# Patient Record
Sex: MALE | Race: WHITE | NOT HISPANIC OR LATINO | Employment: OTHER | URBAN - METROPOLITAN AREA
[De-identification: names, ages, dates, MRNs, and addresses within clinical notes are randomized per-mention and may not be internally consistent; named-entity substitution may affect disease eponyms.]

---

## 2023-11-03 ASSESSMENT — PATIENT HEALTH QUESTIONNAIRE - PHQ9
5. POOR APPETITE OR OVEREATING: 0
7. TROUBLE CONCENTRATING ON THINGS, SUCH AS READING THE NEWSPAPER OR WATCHING TELEVISION: 0
6. FEELING BAD ABOUT YOURSELF - OR THAT YOU ARE A FAILURE OR HAVE LET YOURSELF OR YOUR FAMILY DOWN: NOT AT ALL
4. FEELING TIRED OR HAVING LITTLE ENERGY: 0
1. LITTLE INTEREST OR PLEASURE IN DOING THINGS: NOT AT ALL
4. FEELING TIRED OR HAVING LITTLE ENERGY: NOT AT ALL
SUM OF ALL RESPONSES TO PHQ QUESTIONS 1-9: 0
2. FEELING DOWN, DEPRESSED OR HOPELESS: NOT AT ALL
3. TROUBLE FALLING OR STAYING ASLEEP OR SLEEPING TOO MUCH: NOT AT ALL
3. TROUBLE FALLING OR STAYING ASLEEP OR SLEEPING TOO MUCH: NOT AT ALL
7. TROUBLE CONCENTRATING ON THINGS, SUCH AS READING THE NEWSPAPER OR WATCHING TELEVISION: NOT AT ALL
2. FEELING DOWN, DEPRESSED, IRRITABLE, OR HOPELESS: NOT AT ALL
9. THOUGHTS THAT YOU WOULD BE BETTER OFF DEAD, OR OF HURTING YOURSELF: NOT AT ALL
4. FEELING TIRED OR HAVING LITTLE ENERGY: NOT AT ALL
9. THOUGHTS THAT YOU WOULD BE BETTER OFF DEAD, OR OF HURTING YOURSELF: 0
1. LITTLE INTEREST OR PLEASURE IN DOING THINGS: NOT AT ALL
SUM OF ALL RESPONSES TO PHQ QUESTIONS 1-9: 0
8. MOVING OR SPEAKING SO SLOWLY THAT OTHER PEOPLE COULD HAVE NOTICED. OR THE OPPOSITE, BEING SO FIGETY OR RESTLESS THAT YOU HAVE BEEN MOVING AROUND A LOT MORE THAN USUAL: 0
5. POOR APPETITE OR OVEREATING: NOT AT ALL
5. POOR APPETITE OR OVEREATING: NOT AT ALL
8. MOVING OR SPEAKING SO SLOWLY THAT OTHER PEOPLE COULD HAVE NOTICED. OR THE OPPOSITE, BEING SO FIGETY OR RESTLESS THAT YOU HAVE BEEN MOVING AROUND A LOT MORE THAN USUAL: NOT AT ALL
9. THOUGHTS THAT YOU WOULD BE BETTER OFF DEAD, OR OF HURTING YOURSELF: NOT AT ALL
6. FEELING BAD ABOUT YOURSELF - OR THAT YOU ARE A FAILURE OR HAVE LET YOURSELF OR YOUR FAMILY DOWN: NOT AT ALL
7. TROUBLE CONCENTRATING ON THINGS, SUCH AS READING THE NEWSPAPER OR WATCHING TELEVISION: NOT AT ALL
8. MOVING OR SPEAKING SO SLOWLY THAT OTHER PEOPLE COULD HAVE NOTICED. OR THE OPPOSITE, BEING SO FIGETY OR RESTLESS THAT YOU HAVE BEEN MOVING AROUND A LOT MORE THAN USUAL: NOT AT ALL
2. FEELING DOWN, DEPRESSED, IRRITABLE, OR HOPELESS: 0
1. LITTLE INTEREST OR PLEASURE IN DOING THINGS: 0
6. FEELING BAD ABOUT YOURSELF - OR THAT YOU ARE A FAILURE OR HAVE LET YOURSELF OR YOUR FAMILY DOWN: 0
3. TROUBLE FALLING OR STAYING ASLEEP OR SLEEPING TOO MUCH: 0

## 2023-11-06 PROBLEM — E78.00 PURE HYPERCHOLESTEROLEMIA: Status: ACTIVE | Noted: 2023-11-06

## 2023-11-06 PROBLEM — K76.89 HEPATIC CYST: Status: ACTIVE | Noted: 2023-11-06

## 2023-11-06 PROBLEM — I49.3 MULTIPLE PREMATURE VENTRICULAR COMPLEXES: Status: ACTIVE | Noted: 2023-11-06

## 2023-11-06 PROBLEM — E66.811 OBESITY, CLASS I, BMI 30-34.9: Status: ACTIVE | Noted: 2018-06-20

## 2023-11-06 PROBLEM — Z79.82 CURRENT USE OF ASPIRIN: Status: ACTIVE | Noted: 2023-11-06

## 2023-11-06 PROBLEM — D36.9 TUBULAR ADENOMA: Status: ACTIVE | Noted: 2023-11-06

## 2023-11-06 PROBLEM — E66.9 OBESITY, CLASS I, BMI 30-34.9: Status: ACTIVE | Noted: 2018-06-20

## 2023-11-06 PROBLEM — I10 ESSENTIAL HYPERTENSION, BENIGN: Status: ACTIVE | Noted: 2017-12-20

## 2023-11-06 PROBLEM — D46.9 MYELODYSPLASTIC SYNDROME (MULTI): Status: ACTIVE | Noted: 2020-12-23

## 2023-11-06 PROBLEM — R00.1 BRADYCARDIA, SINUS: Status: ACTIVE | Noted: 2023-11-06

## 2023-11-06 PROBLEM — R91.1 LUNG NODULE: Status: ACTIVE | Noted: 2023-11-06

## 2023-11-06 PROBLEM — N28.1 RENAL CYST: Status: ACTIVE | Noted: 2023-11-06

## 2023-11-06 PROBLEM — Z71.89 CARDIAC RISK COUNSELING: Status: ACTIVE | Noted: 2023-11-06

## 2023-11-06 PROBLEM — Z86.010 HISTORY OF COLON POLYPS: Status: ACTIVE | Noted: 2023-11-06

## 2023-11-06 PROBLEM — Z86.0100 HISTORY OF COLON POLYPS: Status: ACTIVE | Noted: 2023-11-06

## 2023-11-06 RX ORDER — METFORMIN HYDROCHLORIDE 500 MG/1
0.5 TABLET ORAL DAILY
COMMUNITY
Start: 2023-03-23

## 2023-11-06 RX ORDER — AMLODIPINE BESYLATE 10 MG/1
10 TABLET ORAL
COMMUNITY
Start: 2023-10-13

## 2023-11-06 RX ORDER — ATORVASTATIN CALCIUM 10 MG/1
1 TABLET, FILM COATED ORAL DAILY
COMMUNITY
Start: 2017-09-19 | End: 2023-11-08 | Stop reason: SDUPTHER

## 2023-11-06 RX ORDER — HYDROCHLOROTHIAZIDE 25 MG/1
1 TABLET ORAL DAILY
COMMUNITY
Start: 2023-08-21

## 2023-11-06 RX ORDER — LOSARTAN POTASSIUM 100 MG/1
1 TABLET ORAL DAILY
COMMUNITY
Start: 2023-08-21

## 2023-11-08 ENCOUNTER — OFFICE VISIT (OUTPATIENT)
Dept: CARDIOLOGY | Facility: CLINIC | Age: 68
End: 2023-11-08
Payer: MEDICARE

## 2023-11-08 VITALS
WEIGHT: 225.6 LBS | DIASTOLIC BLOOD PRESSURE: 60 MMHG | HEIGHT: 71 IN | BODY MASS INDEX: 31.58 KG/M2 | OXYGEN SATURATION: 96 % | SYSTOLIC BLOOD PRESSURE: 125 MMHG | HEART RATE: 66 BPM

## 2023-11-08 DIAGNOSIS — R00.1 BRADYCARDIA, SINUS: ICD-10-CM

## 2023-11-08 DIAGNOSIS — I49.3 MULTIPLE PREMATURE VENTRICULAR COMPLEXES: Primary | ICD-10-CM

## 2023-11-08 DIAGNOSIS — E78.00 PURE HYPERCHOLESTEROLEMIA: ICD-10-CM

## 2023-11-08 DIAGNOSIS — I10 ESSENTIAL HYPERTENSION, BENIGN: ICD-10-CM

## 2023-11-08 PROCEDURE — 3074F SYST BP LT 130 MM HG: CPT | Performed by: INTERNAL MEDICINE

## 2023-11-08 PROCEDURE — 99213 OFFICE O/P EST LOW 20 MIN: CPT | Performed by: INTERNAL MEDICINE

## 2023-11-08 PROCEDURE — 93000 ELECTROCARDIOGRAM COMPLETE: CPT | Performed by: INTERNAL MEDICINE

## 2023-11-08 PROCEDURE — 3078F DIAST BP <80 MM HG: CPT | Performed by: INTERNAL MEDICINE

## 2023-11-08 PROCEDURE — 1036F TOBACCO NON-USER: CPT | Performed by: INTERNAL MEDICINE

## 2023-11-08 PROCEDURE — 1160F RVW MEDS BY RX/DR IN RCRD: CPT | Performed by: INTERNAL MEDICINE

## 2023-11-08 PROCEDURE — 1159F MED LIST DOCD IN RCRD: CPT | Performed by: INTERNAL MEDICINE

## 2023-11-08 RX ORDER — ATORVASTATIN CALCIUM 20 MG/1
20 TABLET, FILM COATED ORAL DAILY
Qty: 90 TABLET | Refills: 3 | Status: SHIPPED | OUTPATIENT
Start: 2023-11-08 | End: 2024-11-07

## 2023-11-08 RX ORDER — NIACINAMIDE 500 MG
500 TABLET ORAL 2 TIMES DAILY
COMMUNITY
Start: 2023-09-11

## 2023-11-08 RX ORDER — BETAMETHASONE DIPROPIONATE 0.5 MG/G
0.05 CREAM TOPICAL DAILY
COMMUNITY
Start: 2023-09-10

## 2023-11-08 NOTE — PATIENT INSTRUCTIONS
Weight loss  Heart healthy diet-more plants the better  Increase atorvastatin to 20 mg daily  Increase activity-walk 30 minutes daily

## 2023-11-08 NOTE — PROGRESS NOTES
"Chief Complaint:   Hypertension, Bradycardia, and pvcs (Annual)     History Of Present Illness:    Sebastien Christian is a 68 y.o. male presenting with CV dz  Patient denies chest pain/SOB/palpitations/dizziness/lightheadedness/edema/claudication    No regular exercise-\"lazy\"     Last Recorded Vitals:  Vitals:    11/08/23 1504 11/08/23 1532   BP: 150/78 125/60   BP Location: Left arm    Patient Position: Sitting    BP Cuff Size: Adult    Pulse: 66    SpO2: 96%    Weight: 102 kg (225 lb 9.6 oz)    Height: 1.791 m (5' 10.5\")             Allergies:  Patient has no known allergies.    Outpatient Medications:  Current Outpatient Medications   Medication Instructions    amLODIPine (NORVASC) 10 mg, oral, Daily RT    atorvastatin (Lipitor) 10 mg tablet 1 tablet, oral, Daily    betamethasone, augmented, (Diprolene AF) 0.05 % cream 0.05 g, Topical, Daily    hydroCHLOROthiazide (HYDRODiuril) 25 mg tablet 1 tablet, oral, Daily    losartan (Cozaar) 100 mg tablet 1 tablet, oral, Daily    metFORMIN (Glucophage) 500 mg tablet 0.5 tablets, oral, Daily    niacinamide 500 mg, oral, 2 times daily       Physical Exam:  Constitutional:       Appearance: Healthy appearance. Overweight and not in distress.   Neck:      Vascular: No JVR. JVD normal.   Pulmonary:      Effort: Pulmonary effort is normal.      Breath sounds: Normal breath sounds. No wheezing. No rhonchi. No rales.   Chest:      Chest wall: Not tender to palpatation.   Cardiovascular:      PMI at left midclavicular line. Normal rate. Regular rhythm. Normal S1. Normal S2.       Murmurs: There is no murmur.      No gallop.  No click. No rub.   Pulses:     Intact distal pulses.   Edema:     Peripheral edema absent.   Abdominal:      General: Abdomen is protuberant. Bowel sounds are normal.      Palpations: Abdomen is soft.      Tenderness: There is no abdominal tenderness.   Musculoskeletal: Normal range of motion.         General: No tenderness. Skin:     General: Skin is warm and " dry.   Neurological:      General: No focal deficit present.      Mental Status: Alert and oriented to person, place and time.          Last Labs:  CBC -  Lab Results   Component Value Date    WBC 3.8 (L) 05/11/2023    HGB 15.5 05/11/2023    HCT 45.4 05/11/2023    MCV 86 05/11/2023    PLT 84 (L) 05/11/2023       CMP -  Lab Results   Component Value Date    CALCIUM 9.3 05/11/2023    PROT 6.9 05/11/2023    ALBUMIN 4.3 05/11/2023    AST 15 05/11/2023    ALT 19 05/11/2023    ALKPHOS 52 05/11/2023    BILITOT 0.9 05/11/2023       LIPID PANEL -   Lab Results   Component Value Date    CHOL 174 04/09/2020    TRIG 74 04/09/2020    HDL 52.2 04/09/2020    CHHDL 3.3 04/09/2020    LDLF 107 (H) 04/09/2020    VLDL 15 04/09/2020 11/2023 MKEXL=627  QIK=963    RENAL FUNCTION PANEL -   Lab Results   Component Value Date    GLUCOSE 104 (H) 05/11/2023     05/11/2023    K 3.8 05/11/2023     05/11/2023    CO2 28 05/11/2023    ANIONGAP 11 05/11/2023    BUN 18 05/11/2023    CREATININE 1.03 05/11/2023    GFRMALE 79 05/11/2023    CALCIUM 9.3 05/11/2023    ALBUMIN 4.3 05/11/2023        Lab Results   Component Value Date    HGBA1C 6.3 (H) 10/30/2023           Lab review: I have personally reviewed the laboratory result(s)       Problem List Items Addressed This Visit       Bradycardia, sinus    Overview     Resolved after stopping propranolol         Relevant Orders    ECG 12 lead (Clinic Performed) (Completed)    Essential hypertension, benign    Overview     BP well controlled.         Relevant Orders    ECG 12 lead (Clinic Performed) (Completed)    Multiple premature ventricular complexes - Primary    Overview     No structural heart dz per 11/2021 stress echo  In NSR on exam/EKG today  Had bradycardia on beta blocker         Pure hypercholesterolemia    Overview     On moderate intensity statin with QOQ=761.As has DM will increase atorv to 20 mg daily Note: zero calcium score in the past.                Vahid Cruz,

## 2023-11-09 ENCOUNTER — OFFICE VISIT (OUTPATIENT)
Dept: HEMATOLOGY/ONCOLOGY | Facility: CLINIC | Age: 68
End: 2023-11-09
Payer: MEDICARE

## 2023-11-09 ENCOUNTER — LAB (OUTPATIENT)
Dept: LAB | Facility: CLINIC | Age: 68
End: 2023-11-09
Payer: MEDICARE

## 2023-11-09 VITALS
SYSTOLIC BLOOD PRESSURE: 162 MMHG | HEART RATE: 68 BPM | TEMPERATURE: 96.8 F | WEIGHT: 224.87 LBS | RESPIRATION RATE: 16 BRPM | OXYGEN SATURATION: 96 % | DIASTOLIC BLOOD PRESSURE: 78 MMHG | BODY MASS INDEX: 31.81 KG/M2

## 2023-11-09 DIAGNOSIS — D46.9 MYELODYSPLASTIC SYNDROME (MULTI): Primary | ICD-10-CM

## 2023-11-09 DIAGNOSIS — D75.9 IDIOPATHIC CYTOPENIA OF UNDETERMINED SIGNIFICANCE (ICUS): ICD-10-CM

## 2023-11-09 LAB
ALBUMIN SERPL BCP-MCNC: 4.4 G/DL (ref 3.4–5)
ALP SERPL-CCNC: 50 U/L (ref 33–136)
ALT SERPL W P-5'-P-CCNC: 21 U/L (ref 10–52)
ANION GAP SERPL CALC-SCNC: 10 MMOL/L (ref 10–20)
AST SERPL W P-5'-P-CCNC: 17 U/L (ref 9–39)
BASOPHILS # BLD MANUAL: 0.04 X10*3/UL (ref 0–0.1)
BASOPHILS NFR BLD MANUAL: 1 %
BILIRUB SERPL-MCNC: 0.7 MG/DL (ref 0–1.2)
BUN SERPL-MCNC: 22 MG/DL (ref 6–23)
CALCIUM SERPL-MCNC: 9.7 MG/DL (ref 8.6–10.3)
CHLORIDE SERPL-SCNC: 106 MMOL/L (ref 98–107)
CO2 SERPL-SCNC: 29 MMOL/L (ref 21–32)
CREAT SERPL-MCNC: 0.85 MG/DL (ref 0.5–1.3)
EOSINOPHIL # BLD MANUAL: 0 X10*3/UL (ref 0–0.7)
EOSINOPHIL NFR BLD MANUAL: 0 %
ERYTHROCYTE [DISTWIDTH] IN BLOOD BY AUTOMATED COUNT: 14.8 % (ref 11.5–14.5)
GFR SERPL CREATININE-BSD FRML MDRD: >90 ML/MIN/1.73M*2
GIANT PLATELETS BLD QL SMEAR: NORMAL
GLUCOSE SERPL-MCNC: 103 MG/DL (ref 74–99)
HCT VFR BLD AUTO: 46.6 % (ref 41–52)
HGB BLD-MCNC: 15.2 G/DL (ref 13.5–17.5)
IMM GRANULOCYTES # BLD AUTO: 0.02 X10*3/UL (ref 0–0.7)
IMM GRANULOCYTES NFR BLD AUTO: 0.6 % (ref 0–0.9)
LDH SERPL L TO P-CCNC: 199 U/L (ref 84–246)
LYMPHOCYTES # BLD MANUAL: 1.51 X10*3/UL (ref 1.2–4.8)
LYMPHOCYTES NFR BLD MANUAL: 42 %
MCH RBC QN AUTO: 28.8 PG (ref 26–34)
MCHC RBC AUTO-ENTMCNC: 32.6 G/DL (ref 32–36)
MCV RBC AUTO: 88 FL (ref 80–100)
MONOCYTES # BLD MANUAL: 0.54 X10*3/UL (ref 0.1–1)
MONOCYTES NFR BLD MANUAL: 15 %
NEUTS SEG # BLD MANUAL: 1.51 X10*3/UL (ref 1.2–7)
NEUTS SEG NFR BLD MANUAL: 42 %
NRBC BLD-RTO: ABNORMAL /100{WBCS}
PLATELET # BLD AUTO: 94 X10*3/UL (ref 150–450)
POTASSIUM SERPL-SCNC: 4.2 MMOL/L (ref 3.5–5.3)
PROT SERPL-MCNC: 7.2 G/DL (ref 6.4–8.2)
RBC # BLD AUTO: 5.28 X10*6/UL (ref 4.5–5.9)
RBC MORPH BLD: NORMAL
SODIUM SERPL-SCNC: 141 MMOL/L (ref 136–145)
TOTAL CELLS COUNTED BLD: 100
URATE SERPL-MCNC: 4.9 MG/DL (ref 4–7.5)
WBC # BLD AUTO: 3.6 X10*3/UL (ref 4.4–11.3)

## 2023-11-09 PROCEDURE — 1126F AMNT PAIN NOTED NONE PRSNT: CPT | Performed by: INTERNAL MEDICINE

## 2023-11-09 PROCEDURE — 99213 OFFICE O/P EST LOW 20 MIN: CPT | Performed by: INTERNAL MEDICINE

## 2023-11-09 PROCEDURE — 80053 COMPREHEN METABOLIC PANEL: CPT

## 2023-11-09 PROCEDURE — 84550 ASSAY OF BLOOD/URIC ACID: CPT

## 2023-11-09 PROCEDURE — 36415 COLL VENOUS BLD VENIPUNCTURE: CPT

## 2023-11-09 PROCEDURE — 1160F RVW MEDS BY RX/DR IN RCRD: CPT | Performed by: INTERNAL MEDICINE

## 2023-11-09 PROCEDURE — 83615 LACTATE (LD) (LDH) ENZYME: CPT

## 2023-11-09 PROCEDURE — 3078F DIAST BP <80 MM HG: CPT | Performed by: INTERNAL MEDICINE

## 2023-11-09 PROCEDURE — 3077F SYST BP >= 140 MM HG: CPT | Performed by: INTERNAL MEDICINE

## 2023-11-09 PROCEDURE — 85027 COMPLETE CBC AUTOMATED: CPT

## 2023-11-09 PROCEDURE — 85007 BL SMEAR W/DIFF WBC COUNT: CPT

## 2023-11-09 PROCEDURE — 1036F TOBACCO NON-USER: CPT | Performed by: INTERNAL MEDICINE

## 2023-11-09 PROCEDURE — 1159F MED LIST DOCD IN RCRD: CPT | Performed by: INTERNAL MEDICINE

## 2023-11-09 ASSESSMENT — ENCOUNTER SYMPTOMS
VOMITING: 0
NERVOUS/ANXIOUS: 0
APPETITE CHANGE: 0
DYSPHORIC MOOD: 0
CHILLS: 0
JOINT SWELLING: 0
DIARRHEA: 0
ADENOPATHY: 0
DIFFICULTY URINATING: 0
CONSTIPATION: 0
COUGH: 0
NUMBNESS: 0
LIGHT-HEADEDNESS: 0
DIAPHORESIS: 0
FLANK PAIN: 0
RHINORRHEA: 0
DIZZINESS: 0
FEVER: 0
OCCASIONAL FEELINGS OF UNSTEADINESS: 0
BRUISES/BLEEDS EASILY: 0
FREQUENCY: 0
DYSURIA: 0
DEPRESSION: 0
CONFUSION: 0
LOSS OF SENSATION IN FEET: 0
SINUS PAIN: 0
SORE THROAT: 0
WEAKNESS: 0
HEADACHES: 0
ABDOMINAL PAIN: 0
SHORTNESS OF BREATH: 0
UNEXPECTED WEIGHT CHANGE: 0
NAUSEA: 0
FATIGUE: 0
SINUS PRESSURE: 0
BACK PAIN: 0
ARTHRALGIAS: 0
ACTIVITY CHANGE: 0

## 2023-11-09 ASSESSMENT — PATIENT HEALTH QUESTIONNAIRE - PHQ9
1. LITTLE INTEREST OR PLEASURE IN DOING THINGS: NOT AT ALL
SUM OF ALL RESPONSES TO PHQ9 QUESTIONS 1 AND 2: 0
2. FEELING DOWN, DEPRESSED OR HOPELESS: NOT AT ALL

## 2023-11-09 ASSESSMENT — COLUMBIA-SUICIDE SEVERITY RATING SCALE - C-SSRS
2. HAVE YOU ACTUALLY HAD ANY THOUGHTS OF KILLING YOURSELF?: NO
6. HAVE YOU EVER DONE ANYTHING, STARTED TO DO ANYTHING, OR PREPARED TO DO ANYTHING TO END YOUR LIFE?: NO
1. IN THE PAST MONTH, HAVE YOU WISHED YOU WERE DEAD OR WISHED YOU COULD GO TO SLEEP AND NOT WAKE UP?: NO

## 2023-11-09 ASSESSMENT — PAIN SCALES - GENERAL: PAINLEVEL: 0-NO PAIN

## 2023-11-09 NOTE — ASSESSMENT & PLAN NOTE
MDS - low blasts (WHO 2022)   Monitoring:  - CBCs every 6 months  - reviewed call precautions     Treatment:  - none at this time     F/U in 6 months visit with CELIA. Pt is aware to call us in the interim with any questions, concerns, or changing symptoms.

## 2023-11-09 NOTE — LETTER
"November 9, 2023     Doyle Child MD  [address]  [address]  [facility] [address]    Patient: Sebastien Christian   YOB: 1955   Date of Visit: 11/9/2023       Dear Dr. Doyle Child MD:    Thank you for referring Sebastien Christian to me for evaluation. Below are my notes for this consultation.  If you have questions, please do not hesitate to call me. I look forward to following your patient along with you.       Sincerely,     Luis Carlos Rascon MD      CC: No Recipients  ______________________________________________________________________________________    Patient ID: Gerry Christian \"rFanki" is a 68 y.o. male.  Referring Physician: No referring provider defined for this encounter.  Primary Care Provider: Doyle Child MD    Date of Service:  11/9/2023    ASSESSMENT and PLAN:      Problem List Items Addressed This Visit       Myelodysplastic syndrome (CMS/HCC) - Primary    Current Assessment & Plan     MDS - low blasts (WHO 2022)   Monitoring:  - CBCs every 6 months  - reviewed call precautions     Treatment:  - none at this time     F/U in 6 months visit with CELIA. Pt is aware to call us in the interim with any questions, concerns, or changing symptoms.           Relevant Orders    Clinic Appointment Request [facility] TORRES    CBC and Auto Differential    Comprehensive metabolic panel    Lactate dehydrogenase     Other Visit Diagnoses       Idiopathic cytopenia of undetermined significance (ICUS)        Relevant Orders    Comprehensive Metabolic Panel (Completed)    CBC and Auto Differential (Completed)    Lactate Dehydrogenase    Uric Acid                      Oncology History Overview Note   Clonal Cytopenia of Undetermined Significance:  diagnosis:   Mr. Christian has been followed since 2015 for a chronic neutropenia.  He had a bone marrow biopsy in 2015 that resulted in no evidence of myelodysplastic syndrome.  In 2019, with a co-occurrence of now midl thrombocytopenia of platelets " 100-150, Dr. Shi sent peripheral flow that returned with low  expression, and a repeat marrow was completed on 9/9/2019.  This identified mild megakaryocyte atypia not meeting the critiera for myelodysplastic syndrome.  Potassiumarytype remained normal, but  a myeloid next generation sequencing panel identified mutations in ASXL1, SRSF2, and two mutations in TET2.  Thus, consistent wtih clonal cytopenia of undetermined significance.  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 Christian had a bone marrow biopsy completed on 12/16/2020 that demonstrated:   -- HYPERCELLULAR MARROW FOR AGE (70-80%) WITH DYSMEGAKARYOPOIESIS (>10%),  MILD  DYSERYTHROPOIESIS AND NO INCREASE IN BLASTS. SEE NOTE.      NOTE: Per electronic medical records, patient's previous diagnosis of clonal  cytopenias of undetermined significance (CCUS) is noted. Current bone marrow  biopsy and aspirate smears showed hypercellular marrow for age (70-80%) with  dysmegakaryopoiesis (>10%) and mild dyserythropoiesis. There is no increase  in  blast count by aspirate smear differential count and by CD34 immunostain. Flow  cytometry studies showed monocytes with aberrant CD56 expression and mature  granulocytes with low-level , findings associated with myelodysplasia.  Overall, the morphologic,immunophenotypic and genetic findings are compatible  with myelodysplastic syndrome with single lineage dysplasia (MDS-SLD). Clinical  correlation is recommended.     Treatment: none to date - patient is observed        Myelodysplastic syndrome (CMS/HCC)   12/23/2020 Initial Diagnosis    Myelodysplastic syndrome (CMS/HCC)              Subjective      History of Present Illness:  Overall is felt well, no fevers, no chills no unexpected weight loss, no bleeding or bruising issues.  In  general review of systems was pretty unremarkable.  No major health events.  Was happy to hear that his blood counts continue to be stable, noted his platelets are actually little bit higher (discussed that platelet count was actually fairly stable, consistent with normal variation)        Reviewed and Past Medical History, Past Surgical History, Family History, and Social History:    Review of Systems   Constitutional:  Negative for activity change, appetite change, chills, diaphoresis, fatigue, fever and unexpected weight change.   HENT:  Negative for congestion, dental problem, mouth sores, nosebleeds, rhinorrhea, sinus pressure, sinus pain and sore throat.    Eyes:  Negative for visual disturbance.        Negative for Dry eye   Respiratory:  Negative for cough and shortness of breath.    Cardiovascular:  Negative for chest pain and leg swelling.   Gastrointestinal:  Negative for abdominal pain, constipation, diarrhea, nausea and vomiting.   Genitourinary:  Negative for difficulty urinating, dysuria, flank pain, frequency and urgency.   Musculoskeletal:  Negative for arthralgias, back pain and joint swelling.   Skin:  Negative for rash.   Neurological:  Negative for dizziness, weakness, light-headedness, numbness and headaches.   Hematological:  Negative for adenopathy. Does not bruise/bleed easily.   Psychiatric/Behavioral:  Negative for confusion and dysphoric mood. The patient is not nervous/anxious.        Home Medications and Adherence Reviewed with Patient.       Objective     VS:  /78   Pulse 68   Temp 36 °C (96.8 °F)   Resp 16   Wt 102 kg (224 lb 13.9 oz)   SpO2 96%   BMI 31.81 kg/m²   BSA: 2.25 meters squared    Physical Exam  Constitutional:       Appearance: Normal appearance.   HENT:      Mouth/Throat:      Mouth: Mucous membranes are moist.   Eyes:      Conjunctiva/sclera: Conjunctivae normal.      Pupils: Pupils are equal, round, and reactive to light.   Cardiovascular:      Rate and  Rhythm: Normal rate.   Pulmonary:      Effort: Pulmonary effort is normal.   Abdominal:      General: Abdomen is flat.      Palpations: Abdomen is soft.   Musculoskeletal:         General: Normal range of motion.   Skin:     General: Skin is warm and dry.   Neurological:      General: No focal deficit present.      Mental Status: He is oriented to person, place, and time.   Psychiatric:         Mood and Affect: Mood normal.         Behavior: Behavior normal.         Laboratory:  Lab on 11/09/2023   Component Date Value Ref Range Status   • Glucose 11/09/2023 103 (H)  74 - 99 mg/dL Final   • Sodium 11/09/2023 141  136 - 145 mmol/L Final   • Potassium 11/09/2023 4.2  3.5 - 5.3 mmol/L Final   • Chloride 11/09/2023 106  98 - 107 mmol/L Final   • Bicarbonate 11/09/2023 29  21 - 32 mmol/L Final   • Anion Gap 11/09/2023 10  10 - 20 mmol/L Final   • Urea Nitrogen 11/09/2023 22  6 - 23 mg/dL Final   • Creatinine 11/09/2023 0.85  0.50 - 1.30 mg/dL Final   • eGFR 11/09/2023 >90  >60 mL/min/1.73m*2 Final   • Calcium 11/09/2023 9.7  8.6 - 10.3 mg/dL Final   • Albumin 11/09/2023 4.4  3.4 - 5.0 g/dL Final   • Alkaline Phosphatase 11/09/2023 50  33 - 136 U/L Final   • Total Protein 11/09/2023 7.2  6.4 - 8.2 g/dL Final   • AST 11/09/2023 17  9 - 39 U/L Final   • Bilirubin, Total 11/09/2023 0.7  0.0 - 1.2 mg/dL Final   • ALT 11/09/2023 21  10 - 52 U/L Final   • WBC 11/09/2023 3.6 (L)  4.4 - 11.3 x10*3/uL Preliminary   • nRBC 11/09/2023    Preliminary   • RBC 11/09/2023 5.28  4.50 - 5.90 x10*6/uL Preliminary   • Hemoglobin 11/09/2023 15.2  13.5 - 17.5 g/dL Preliminary   • Hematocrit 11/09/2023 46.6  41.0 - 52.0 % Preliminary   • MCV 11/09/2023 88  80 - 100 fL Preliminary   • MCH 11/09/2023 28.8  26.0 - 34.0 pg Preliminary   • MCHC 11/09/2023 32.6  32.0 - 36.0 g/dL Preliminary   • RDW 11/09/2023 14.8 (H)  11.5 - 14.5 % Preliminary   • Platelets 11/09/2023 94 (L)  150 - 450 x10*3/uL Preliminary           Luis Carlos Rascon MD

## 2023-11-09 NOTE — PROGRESS NOTES
"Patient ID: Gerry Christian \"Franki" is a 68 y.o. male.  Referring Physician: No referring provider defined for this encounter.  Primary Care Provider: Doyle Child MD    Date of Service:  11/9/2023    ASSESSMENT and PLAN:      Problem List Items Addressed This Visit       Myelodysplastic syndrome (CMS/HCC) - Primary    Current Assessment & Plan     MDS - low blasts (WHO 2022)   Monitoring:  - CBCs every 6 months  - reviewed call precautions     Treatment:  - none at this time     F/U in 6 months visit with CELIA. Pt is aware to call us in the interim with any questions, concerns, or changing symptoms.           Relevant Orders    Clinic Appointment Request KUHLENSCHMIDT, KEATON L    CBC and Auto Differential    Comprehensive metabolic panel    Lactate dehydrogenase     Other Visit Diagnoses       Idiopathic cytopenia of undetermined significance (ICUS)        Relevant Orders    Comprehensive Metabolic Panel (Completed)    CBC and Auto Differential (Completed)    Lactate Dehydrogenase    Uric Acid                      Oncology History Overview Note   Clonal Cytopenia of Undetermined Significance:  diagnosis:   Mr. Christian has been followed since 2015 for a chronic neutropenia.  He had a bone marrow biopsy in 2015 that resulted in no evidence of myelodysplastic syndrome.  In 2019, with a co-occurrence of now midl thrombocytopenia of platelets 100-150, Dr. Shi sent peripheral flow that returned with low  expression, and a repeat marrow was completed on 9/9/2019.  This identified mild megakaryocyte atypia not meeting the critiera for myelodysplastic syndrome.  Potassiumarytype remained normal, but  a myeloid next generation sequencing panel identified mutations in ASXL1, SRSF2, and two mutations in TET2.  Thus, consistent wtih clonal cytopenia of undetermined significance.  Treatment:   none to date     As of 12/16/2020 - this has progressed to MDS-SLD      MDS-NOS (single lineage dysplasia) (ICC 2022) vs MDS " - low blasts (WHO 2022)  Diagnosis:   Due to progressive thrombocytopenia, Mr. Christian had a bone marrow biopsy completed on 12/16/2020 that demonstrated:   -- HYPERCELLULAR MARROW FOR AGE (70-80%) WITH DYSMEGAKARYOPOIESIS (>10%),  MILD  DYSERYTHROPOIESIS AND NO INCREASE IN BLASTS. SEE NOTE.      NOTE: Per electronic medical records, patient's previous diagnosis of clonal  cytopenias of undetermined significance (CCUS) is noted. Current bone marrow  biopsy and aspirate smears showed hypercellular marrow for age (70-80%) with  dysmegakaryopoiesis (>10%) and mild dyserythropoiesis. There is no increase  in  blast count by aspirate smear differential count and by CD34 immunostain. Flow  cytometry studies showed monocytes with aberrant CD56 expression and mature  granulocytes with low-level , findings associated with myelodysplasia.  Overall, the morphologic,immunophenotypic and genetic findings are compatible  with myelodysplastic syndrome with single lineage dysplasia (MDS-SLD). Clinical  correlation is recommended.     Treatment: none to date - patient is observed        Myelodysplastic syndrome (CMS/HCC)   12/23/2020 Initial Diagnosis    Myelodysplastic syndrome (CMS/HCC)              Subjective       History of Present Illness:  Overall is felt well, no fevers, no chills no unexpected weight loss, no bleeding or bruising issues.  In general review of systems was pretty unremarkable.  No major health events.  Was happy to hear that his blood counts continue to be stable, noted his platelets are actually little bit higher (discussed that platelet count was actually fairly stable, consistent with normal variation)        Reviewed and Past Medical History, Past Surgical History, Family History, and Social History:    Review of Systems   Constitutional:  Negative for activity change, appetite change, chills, diaphoresis, fatigue, fever and unexpected weight change.   HENT:  Negative for congestion, dental  problem, mouth sores, nosebleeds, rhinorrhea, sinus pressure, sinus pain and sore throat.    Eyes:  Negative for visual disturbance.        Negative for Dry eye   Respiratory:  Negative for cough and shortness of breath.    Cardiovascular:  Negative for chest pain and leg swelling.   Gastrointestinal:  Negative for abdominal pain, constipation, diarrhea, nausea and vomiting.   Genitourinary:  Negative for difficulty urinating, dysuria, flank pain, frequency and urgency.   Musculoskeletal:  Negative for arthralgias, back pain and joint swelling.   Skin:  Negative for rash.   Neurological:  Negative for dizziness, weakness, light-headedness, numbness and headaches.   Hematological:  Negative for adenopathy. Does not bruise/bleed easily.   Psychiatric/Behavioral:  Negative for confusion and dysphoric mood. The patient is not nervous/anxious.        Home Medications and Adherence Reviewed with Patient.       Objective      VS:  /78   Pulse 68   Temp 36 °C (96.8 °F)   Resp 16   Wt 102 kg (224 lb 13.9 oz)   SpO2 96%   BMI 31.81 kg/m²   BSA: 2.25 meters squared    Physical Exam  Constitutional:       Appearance: Normal appearance.   HENT:      Mouth/Throat:      Mouth: Mucous membranes are moist.   Eyes:      Conjunctiva/sclera: Conjunctivae normal.      Pupils: Pupils are equal, round, and reactive to light.   Cardiovascular:      Rate and Rhythm: Normal rate.   Pulmonary:      Effort: Pulmonary effort is normal.   Abdominal:      General: Abdomen is flat.      Palpations: Abdomen is soft.   Musculoskeletal:         General: Normal range of motion.   Skin:     General: Skin is warm and dry.   Neurological:      General: No focal deficit present.      Mental Status: He is oriented to person, place, and time.   Psychiatric:         Mood and Affect: Mood normal.         Behavior: Behavior normal.         Laboratory:  Lab on 11/09/2023   Component Date Value Ref Range Status    Glucose 11/09/2023 103 (H)  74 - 99  mg/dL Final    Sodium 11/09/2023 141  136 - 145 mmol/L Final    Potassium 11/09/2023 4.2  3.5 - 5.3 mmol/L Final    Chloride 11/09/2023 106  98 - 107 mmol/L Final    Bicarbonate 11/09/2023 29  21 - 32 mmol/L Final    Anion Gap 11/09/2023 10  10 - 20 mmol/L Final    Urea Nitrogen 11/09/2023 22  6 - 23 mg/dL Final    Creatinine 11/09/2023 0.85  0.50 - 1.30 mg/dL Final    eGFR 11/09/2023 >90  >60 mL/min/1.73m*2 Final    Calcium 11/09/2023 9.7  8.6 - 10.3 mg/dL Final    Albumin 11/09/2023 4.4  3.4 - 5.0 g/dL Final    Alkaline Phosphatase 11/09/2023 50  33 - 136 U/L Final    Total Protein 11/09/2023 7.2  6.4 - 8.2 g/dL Final    AST 11/09/2023 17  9 - 39 U/L Final    Bilirubin, Total 11/09/2023 0.7  0.0 - 1.2 mg/dL Final    ALT 11/09/2023 21  10 - 52 U/L Final    WBC 11/09/2023 3.6 (L)  4.4 - 11.3 x10*3/uL Preliminary    nRBC 11/09/2023    Preliminary    RBC 11/09/2023 5.28  4.50 - 5.90 x10*6/uL Preliminary    Hemoglobin 11/09/2023 15.2  13.5 - 17.5 g/dL Preliminary    Hematocrit 11/09/2023 46.6  41.0 - 52.0 % Preliminary    MCV 11/09/2023 88  80 - 100 fL Preliminary    MCH 11/09/2023 28.8  26.0 - 34.0 pg Preliminary    MCHC 11/09/2023 32.6  32.0 - 36.0 g/dL Preliminary    RDW 11/09/2023 14.8 (H)  11.5 - 14.5 % Preliminary    Platelets 11/09/2023 94 (L)  150 - 450 x10*3/uL Preliminary           Luis Carlos Rascon MD

## 2024-05-06 NOTE — PROGRESS NOTES
"Patient ID: Gerry Christian \"Sebastien\" is a 68 y.o. male.  Referring Physician: Luis Carlos Rascon MD  [id number] [id number]  Primary Care Provider: Doyle Child MD    Date of Service:  5/9/2024    ASSESSMENT and PLAN:    Myelodysplastic syndrome (Multi)  MDS - low blasts (WHO 2022)   Monitoring:  - CBCs every 6 months  - reviewed call precautions     Treatment:  - none at this time  - PLT have been on the decline.  Repeat Bmbx if Plt get to 50     F/U in 6 months visit with CELIA. Pt is aware to call us in the interim with any questions, concerns, or changing symptoms.       Oncology History Overview Note   Clonal Cytopenia of Undetermined Significance:  diagnosis:   Mr. Christian has been followed since 2015 for a chronic neutropenia.  He had a bone marrow biopsy in 2015 that resulted in no evidence of myelodysplastic syndrome.  In 2019, with a co-occurrence of now midl thrombocytopenia of platelets 100-150, Dr. Shi sent peripheral flow that returned with low  expression, and a repeat marrow was completed on 9/9/2019.  This identified mild megakaryocyte atypia not meeting the critiera for myelodysplastic syndrome.  Potassiumarytype remained normal, but  a myeloid next generation sequencing panel identified mutations in ASXL1, SRSF2, and two mutations in TET2.  Thus, consistent wtih clonal cytopenia of undetermined significance.  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 Christian had a bone marrow biopsy completed on 12/16/2020 that demonstrated:   -- HYPERCELLULAR MARROW FOR AGE (70-80%) WITH DYSMEGAKARYOPOIESIS (>10%),  MILD  DYSERYTHROPOIESIS AND NO INCREASE IN BLASTS. SEE NOTE.      NOTE: Per electronic medical records, patient's previous diagnosis of clonal  cytopenias of undetermined significance (CCUS) is " noted. Current bone marrow  biopsy and aspirate smears showed hypercellular marrow for age (70-80%) with  dysmegakaryopoiesis (>10%) and mild dyserythropoiesis. There is no increase  in  blast count by aspirate smear differential count and by CD34 immunostain. Flow  cytometry studies showed monocytes with aberrant CD56 expression and mature  granulocytes with low-level , findings associated with myelodysplasia.  Overall, the morphologic,immunophenotypic and genetic findings are compatible  with myelodysplastic syndrome with single lineage dysplasia (MDS-SLD). Clinical  correlation is recommended.     Treatment: none to date - patient is observed        Myelodysplastic syndrome (Multi)   12/23/2020 Initial Diagnosis    Myelodysplastic syndrome (CMS/HCC)        SUBJECTIVE:  History of Present Illness:  Patient presents today, accompanied by his, for follow up.  Reports feeling well. He has been dieting since January and has lost about 28 pounds.  No other health changes or concerns.         Review of Systems   Constitutional:  Negative for chills, fever and unexpected weight change.   HENT:  Negative for mouth sores and nosebleeds.    Respiratory:  Negative for cough, chest tightness and shortness of breath.    Cardiovascular:  Negative for chest pain and leg swelling.   Gastrointestinal:  Negative for abdominal pain, blood in stool, constipation, diarrhea, nausea and vomiting.   Genitourinary:  Negative for dysuria and hematuria.   Skin:  Negative for rash.   Neurological:  Negative for dizziness, light-headedness, numbness and headaches.     OBJECTIVE:  KPS: Karnofsky Score: 100 - Fully active, able to carry on all pre-disease performed without restriction     Physical Exam  Constitutional:       Appearance: Normal appearance.   HENT:      Head: Normocephalic.   Eyes:      Pupils: Pupils are equal, round, and reactive to light.   Cardiovascular:      Rate and Rhythm: Normal rate and regular rhythm.   Pulmonary:       Effort: Pulmonary effort is normal.      Breath sounds: Normal breath sounds.   Abdominal:      General: Bowel sounds are normal.      Palpations: Abdomen is soft.   Musculoskeletal:         General: Normal range of motion.      Cervical back: Normal range of motion and neck supple.   Lymphadenopathy:      Comments: No lymphadenopathy   Skin:     General: Skin is warm and dry.      Findings: No lesion or rash.   Neurological:      General: No focal deficit present.      Mental Status: He is alert and oriented to person, place, and time. Mental status is at baseline.      Comments: No numbness or tingling   Psychiatric:         Mood and Affect: Mood normal.       Current Outpatient Medications   Medication Instructions    amLODIPine (NORVASC) 10 mg, oral, Daily RT    atorvastatin (LIPITOR) 20 mg, oral, Daily    betamethasone, augmented, (Diprolene AF) 0.05 % cream 0.05 g, Topical, Daily    empagliflozin (Jardiance) 10 mg 1 tablet, oral, Daily    hydroCHLOROthiazide (HYDRODiuril) 25 mg tablet 1 tablet, oral, Daily    losartan (Cozaar) 100 mg tablet 1 tablet, oral, Daily    metFORMIN (Glucophage) 500 mg tablet 0.5 tablets, oral, Daily    niacinamide 500 mg, oral, 2 times daily      Laboratory:  The pertinent laboratory results were reviewed and discussed with the patient.    Lab Results   Component Value Date    WBC 3.6 (L) 05/09/2024    HCT 47.2 05/09/2024    HGB 15.8 05/09/2024    PLT 79 (L) 05/09/2024    K 3.9 05/09/2024    CALCIUM 9.3 05/09/2024     05/09/2024    MG 2.10 05/11/2023    BILITOT 0.8 05/09/2024    ALT 19 05/09/2024    AST 16 05/09/2024    BUN 18 05/09/2024    CREATININE 0.86 05/09/2024      Note: for a comprehensive list of the patient's lab results, access the Results Review activity.    RTC:  Q 6 months MD/CELIA follow up    Zenia Torres, APRN-CNP

## 2024-05-09 ENCOUNTER — LAB (OUTPATIENT)
Dept: LAB | Facility: CLINIC | Age: 69
End: 2024-05-09
Payer: MEDICARE

## 2024-05-09 ENCOUNTER — OFFICE VISIT (OUTPATIENT)
Dept: HEMATOLOGY/ONCOLOGY | Facility: CLINIC | Age: 69
End: 2024-05-09
Payer: MEDICARE

## 2024-05-09 DIAGNOSIS — D46.9 MYELODYSPLASTIC SYNDROME (MULTI): Primary | ICD-10-CM

## 2024-05-09 DIAGNOSIS — D46.9 MYELODYSPLASTIC SYNDROME (MULTI): ICD-10-CM

## 2024-05-09 PROBLEM — E66.9 OBESITY, CLASS I, BMI 30-34.9: Status: RESOLVED | Noted: 2018-06-20 | Resolved: 2024-05-09

## 2024-05-09 PROBLEM — E66.811 OBESITY, CLASS I, BMI 30-34.9: Status: RESOLVED | Noted: 2018-06-20 | Resolved: 2024-05-09

## 2024-05-09 LAB
ALBUMIN SERPL BCP-MCNC: 4.2 G/DL (ref 3.4–5)
ALP SERPL-CCNC: 49 U/L (ref 33–136)
ALT SERPL W P-5'-P-CCNC: 19 U/L (ref 10–52)
ANION GAP SERPL CALC-SCNC: 10 MMOL/L (ref 10–20)
AST SERPL W P-5'-P-CCNC: 16 U/L (ref 9–39)
BASOPHILS # BLD AUTO: 0.04 X10*3/UL (ref 0–0.1)
BASOPHILS NFR BLD AUTO: 1.1 %
BILIRUB SERPL-MCNC: 0.8 MG/DL (ref 0–1.2)
BUN SERPL-MCNC: 18 MG/DL (ref 6–23)
CALCIUM SERPL-MCNC: 9.3 MG/DL (ref 8.6–10.3)
CHLORIDE SERPL-SCNC: 103 MMOL/L (ref 98–107)
CO2 SERPL-SCNC: 31 MMOL/L (ref 21–32)
CREAT SERPL-MCNC: 0.86 MG/DL (ref 0.5–1.3)
EGFRCR SERPLBLD CKD-EPI 2021: >90 ML/MIN/1.73M*2
EOSINOPHIL # BLD AUTO: 0.03 X10*3/UL (ref 0–0.7)
EOSINOPHIL NFR BLD AUTO: 0.8 %
ERYTHROCYTE [DISTWIDTH] IN BLOOD BY AUTOMATED COUNT: 14.8 % (ref 11.5–14.5)
GLUCOSE SERPL-MCNC: 101 MG/DL (ref 74–99)
HCT VFR BLD AUTO: 47.2 % (ref 41–52)
HGB BLD-MCNC: 15.8 G/DL (ref 13.5–17.5)
IMM GRANULOCYTES # BLD AUTO: 0.01 X10*3/UL (ref 0–0.7)
IMM GRANULOCYTES NFR BLD AUTO: 0.3 % (ref 0–0.9)
LDH SERPL L TO P-CCNC: 151 U/L (ref 84–246)
LYMPHOCYTES # BLD AUTO: 1.02 X10*3/UL (ref 1.2–4.8)
LYMPHOCYTES NFR BLD AUTO: 28.4 %
MCH RBC QN AUTO: 29.2 PG (ref 26–34)
MCHC RBC AUTO-ENTMCNC: 33.5 G/DL (ref 32–36)
MCV RBC AUTO: 87 FL (ref 80–100)
MONOCYTES # BLD AUTO: 0.85 X10*3/UL (ref 0.1–1)
MONOCYTES NFR BLD AUTO: 23.7 %
NEUTROPHILS # BLD AUTO: 1.64 X10*3/UL (ref 1.2–7.7)
NEUTROPHILS NFR BLD AUTO: 45.7 %
NRBC BLD-RTO: ABNORMAL /100{WBCS}
PLATELET # BLD AUTO: 79 X10*3/UL (ref 150–450)
POTASSIUM SERPL-SCNC: 3.9 MMOL/L (ref 3.5–5.3)
PROT SERPL-MCNC: 6.9 G/DL (ref 6.4–8.2)
RBC # BLD AUTO: 5.41 X10*6/UL (ref 4.5–5.9)
RBC MORPH BLD: NORMAL
SODIUM SERPL-SCNC: 140 MMOL/L (ref 136–145)
WBC # BLD AUTO: 3.6 X10*3/UL (ref 4.4–11.3)

## 2024-05-09 PROCEDURE — 80053 COMPREHEN METABOLIC PANEL: CPT

## 2024-05-09 PROCEDURE — 1159F MED LIST DOCD IN RCRD: CPT | Performed by: NURSE PRACTITIONER

## 2024-05-09 PROCEDURE — 3078F DIAST BP <80 MM HG: CPT | Performed by: NURSE PRACTITIONER

## 2024-05-09 PROCEDURE — 99214 OFFICE O/P EST MOD 30 MIN: CPT | Performed by: NURSE PRACTITIONER

## 2024-05-09 PROCEDURE — 83615 LACTATE (LD) (LDH) ENZYME: CPT

## 2024-05-09 PROCEDURE — 85025 COMPLETE CBC W/AUTO DIFF WBC: CPT

## 2024-05-09 PROCEDURE — 1126F AMNT PAIN NOTED NONE PRSNT: CPT | Performed by: NURSE PRACTITIONER

## 2024-05-09 PROCEDURE — 36415 COLL VENOUS BLD VENIPUNCTURE: CPT

## 2024-05-09 PROCEDURE — 3077F SYST BP >= 140 MM HG: CPT | Performed by: NURSE PRACTITIONER

## 2024-05-09 ASSESSMENT — ENCOUNTER SYMPTOMS
NUMBNESS: 0
CONSTIPATION: 0
SHORTNESS OF BREATH: 0
DIZZINESS: 0
FEVER: 0
ABDOMINAL PAIN: 0
HEMATURIA: 0
CHILLS: 0
VOMITING: 0
HEADACHES: 0
UNEXPECTED WEIGHT CHANGE: 0
COUGH: 0
DIARRHEA: 0
LIGHT-HEADEDNESS: 0
DYSURIA: 0
NAUSEA: 0
CHEST TIGHTNESS: 0
BLOOD IN STOOL: 0

## 2024-05-09 ASSESSMENT — PAIN SCALES - GENERAL: PAINLEVEL: 0-NO PAIN

## 2024-05-09 NOTE — ASSESSMENT & PLAN NOTE
MDS - low blasts (WHO 2022)   Monitoring:  - CBCs every 6 months  - reviewed call precautions     Treatment:  - none at this time  - PLT have been on the decline.  Repeat Bmbx if Plt get to 50     F/U in 6 months visit with CELIA. Pt is aware to call us in the interim with any questions, concerns, or changing symptoms.

## 2024-05-10 VITALS
BODY MASS INDEX: 27.49 KG/M2 | WEIGHT: 192.02 LBS | DIASTOLIC BLOOD PRESSURE: 73 MMHG | TEMPERATURE: 97.5 F | HEART RATE: 68 BPM | RESPIRATION RATE: 16 BRPM | HEIGHT: 70 IN | SYSTOLIC BLOOD PRESSURE: 153 MMHG | OXYGEN SATURATION: 97 %

## 2024-08-01 DIAGNOSIS — E78.00 PURE HYPERCHOLESTEROLEMIA: ICD-10-CM

## 2024-08-01 RX ORDER — ATORVASTATIN CALCIUM 20 MG/1
20 TABLET, FILM COATED ORAL DAILY
Qty: 100 TABLET | Refills: 0 | Status: SHIPPED | OUTPATIENT
Start: 2024-09-17

## 2024-10-25 DIAGNOSIS — E78.00 PURE HYPERCHOLESTEROLEMIA: ICD-10-CM

## 2024-10-25 RX ORDER — ATORVASTATIN CALCIUM 20 MG/1
20 TABLET, FILM COATED ORAL DAILY
COMMUNITY
Start: 2024-12-17

## 2024-11-08 PROBLEM — Z79.82 CURRENT USE OF ASPIRIN: Status: RESOLVED | Noted: 2023-11-06 | Resolved: 2024-11-08

## 2024-11-08 PROBLEM — R73.09 INCREASED GLUCOSE LEVEL: Status: ACTIVE | Noted: 2024-11-08

## 2024-11-11 ENCOUNTER — APPOINTMENT (OUTPATIENT)
Dept: CARDIOLOGY | Facility: CLINIC | Age: 69
End: 2024-11-11
Payer: MEDICARE

## 2024-11-11 VITALS
HEART RATE: 58 BPM | DIASTOLIC BLOOD PRESSURE: 68 MMHG | OXYGEN SATURATION: 98 % | SYSTOLIC BLOOD PRESSURE: 138 MMHG | BODY MASS INDEX: 27.77 KG/M2 | WEIGHT: 194 LBS | HEIGHT: 70 IN

## 2024-11-11 DIAGNOSIS — E78.00 PURE HYPERCHOLESTEROLEMIA: ICD-10-CM

## 2024-11-11 DIAGNOSIS — Z71.89 CARDIAC RISK COUNSELING: ICD-10-CM

## 2024-11-11 DIAGNOSIS — I49.3 MULTIPLE PREMATURE VENTRICULAR COMPLEXES: Primary | ICD-10-CM

## 2024-11-11 DIAGNOSIS — I10 ESSENTIAL HYPERTENSION, BENIGN: ICD-10-CM

## 2024-11-11 PROCEDURE — 1159F MED LIST DOCD IN RCRD: CPT | Performed by: INTERNAL MEDICINE

## 2024-11-11 PROCEDURE — 99213 OFFICE O/P EST LOW 20 MIN: CPT | Mod: 25 | Performed by: INTERNAL MEDICINE

## 2024-11-11 PROCEDURE — 93010 ELECTROCARDIOGRAM REPORT: CPT | Performed by: INTERNAL MEDICINE

## 2024-11-11 PROCEDURE — 93005 ELECTROCARDIOGRAM TRACING: CPT | Performed by: INTERNAL MEDICINE

## 2024-11-11 PROCEDURE — 99213 OFFICE O/P EST LOW 20 MIN: CPT | Performed by: INTERNAL MEDICINE

## 2024-11-11 PROCEDURE — 3078F DIAST BP <80 MM HG: CPT | Performed by: INTERNAL MEDICINE

## 2024-11-11 PROCEDURE — 3008F BODY MASS INDEX DOCD: CPT | Performed by: INTERNAL MEDICINE

## 2024-11-11 PROCEDURE — 1036F TOBACCO NON-USER: CPT | Performed by: INTERNAL MEDICINE

## 2024-11-11 PROCEDURE — 1160F RVW MEDS BY RX/DR IN RCRD: CPT | Performed by: INTERNAL MEDICINE

## 2024-11-11 PROCEDURE — 3075F SYST BP GE 130 - 139MM HG: CPT | Performed by: INTERNAL MEDICINE

## 2024-11-11 NOTE — PROGRESS NOTES
"Patient ID: Gerry Christian \"Sebastien\" is a 69 y.o. male.  Referring Physician: Zenia Torres, APRN-CNP  [address]  [place] [id number]  Primary Care Provider: Doyle Child MD    Date of Service:  11/14/2024  Assessment & Plan  Myelodysplastic syndrome (Multi)  MDS - low blasts (WHO 2022)   Monitoring:  - CBCs every 6 months  - reviewed call precautions  Treatment:  - none at this time  - PLT have been < 100.  87 today.  Repeat Bmbx if Plt get to 50     F/U in 6 months visit with CELIA. Pt is aware to call us in the interim with any questions, concerns, or changing symptoms.       Oncology History Overview Note   Clonal Cytopenia of Undetermined Significance:  diagnosis:   Mr. Christian has been followed since 2015 for a chronic neutropenia.  He had a bone marrow biopsy in 2015 that resulted in no evidence of myelodysplastic syndrome.  In 2019, with a co-occurrence of now midl thrombocytopenia of platelets 100-150, Dr. Shi sent peripheral flow that returned with low  expression, and a repeat marrow was completed on 9/9/2019.  This identified mild megakaryocyte atypia not meeting the critiera for myelodysplastic syndrome.  Potassiumarytype remained normal, but  a myeloid next generation sequencing panel identified mutations in ASXL1, SRSF2, and two mutations in TET2.  Thus, consistent wtih clonal cytopenia of undetermined significance.  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 Christian had a bone marrow biopsy completed on 12/16/2020 that demonstrated:   -- HYPERCELLULAR MARROW FOR AGE (70-80%) WITH DYSMEGAKARYOPOIESIS (>10%),  MILD  DYSERYTHROPOIESIS AND NO INCREASE IN BLASTS. SEE NOTE.      NOTE: Per electronic medical records, patient's previous diagnosis of clonal  cytopenias of undetermined significance (CCUS) is " noted. Current bone marrow  biopsy and aspirate smears showed hypercellular marrow for age (70-80%) with  dysmegakaryopoiesis (>10%) and mild dyserythropoiesis. There is no increase  in  blast count by aspirate smear differential count and by CD34 immunostain. Flow  cytometry studies showed monocytes with aberrant CD56 expression and mature  granulocytes with low-level , findings associated with myelodysplasia.  Overall, the morphologic,immunophenotypic and genetic findings are compatible  with myelodysplastic syndrome with single lineage dysplasia (MDS-SLD). Clinical  correlation is recommended.     Treatment: none to date - patient is observed        Myelodysplastic syndrome (Multi)   12/23/2020 Initial Diagnosis    Myelodysplastic syndrome (CMS/HCC)        SUBJECTIVE:  Patient presents today, accompanied by his wife, for follow up.  Reports feeling well.  He has no questions or concerns today.      Review of Systems   Constitutional:  Negative for chills, fever and unexpected weight change.   HENT:  Negative for mouth sores and nosebleeds.    Respiratory:  Negative for cough, chest tightness and shortness of breath.    Cardiovascular:  Negative for chest pain and leg swelling.   Gastrointestinal:  Negative for abdominal pain, blood in stool, constipation, diarrhea, nausea and vomiting.   Genitourinary:  Negative for dysuria and hematuria.   Skin:  Negative for rash.   Neurological:  Negative for dizziness, light-headedness, numbness and headaches.     OBJECTIVE:  KPS: Karnofsky Score: 100 - Fully active, able to carry on all pre-disease performed without restriction     Physical Exam  Constitutional:       Appearance: Normal appearance.   HENT:      Head: Normocephalic.   Eyes:      Pupils: Pupils are equal, round, and reactive to light.   Cardiovascular:      Rate and Rhythm: Normal rate and regular rhythm.   Pulmonary:      Effort: Pulmonary effort is normal.      Breath sounds: Normal breath sounds.    Abdominal:      General: Bowel sounds are normal.      Palpations: Abdomen is soft.   Musculoskeletal:         General: Normal range of motion.      Cervical back: Normal range of motion and neck supple.   Lymphadenopathy:      Comments: No lymphadenopathy   Skin:     General: Skin is warm and dry.      Findings: No lesion or rash.   Neurological:      General: No focal deficit present.      Mental Status: He is alert and oriented to person, place, and time. Mental status is at baseline.      Comments: No numbness or tingling   Psychiatric:         Mood and Affect: Mood normal.       Current Outpatient Medications   Medication Instructions    amLODIPine (NORVASC) 10 mg, Daily RT    atorvastatin (LIPITOR) 20 mg, oral, Daily    betamethasone, augmented, (Diprolene AF) 0.05 % cream 0.05 g, Daily    empagliflozin (Jardiance) 10 mg 1 tablet, Daily    hydroCHLOROthiazide (HYDRODiuril) 25 mg tablet 1 tablet, Daily    losartan (Cozaar) 100 mg tablet 1 tablet, Daily    metFORMIN (Glucophage) 500 mg tablet 0.5 tablets, Daily    niacinamide 500 mg, 2 times daily      Laboratory:  The pertinent laboratory results were reviewed and discussed with the patient.    Lab Results   Component Value Date    WBC 3.6 (L) 05/09/2024    HCT 47.2 05/09/2024    HGB 15.8 05/09/2024    PLT 79 (L) 05/09/2024    K 3.9 05/09/2024    CALCIUM 9.3 05/09/2024     05/09/2024    MG 2.10 05/11/2023    BILITOT 0.8 05/09/2024    ALT 19 05/09/2024    AST 16 05/09/2024    BUN 18 05/09/2024    CREATININE 0.86 05/09/2024      Note: for a comprehensive list of the patient's lab results, access the Results Review activity.    RTC:  Q 6 month MD/CELIA follow up    Zenia Torres, DONTAE-CNP

## 2024-11-11 NOTE — PROGRESS NOTES
"Chief Complaint:   No chief complaint on file.     History Of Present Illness:    Sebastien Christian is a 69 y.o. male presenting with CV dz  Patient denies chest pain/SOB/palpitations/dizziness/lightheadedness/edema/claudication    Active-walks/keeps busy     Last Recorded Vitals:  Vitals:    11/11/24 1511   BP: 138/68   BP Location: Right arm   Patient Position: Sitting   BP Cuff Size: Adult   Pulse: 58   SpO2: 98%   Weight: 88 kg (194 lb)   Height: 1.778 m (5' 10\")            Allergies:  Patient has no known allergies.    Outpatient Medications:  Current Outpatient Medications   Medication Instructions    amLODIPine (NORVASC) 10 mg, Daily RT    atorvastatin (LIPITOR) 20 mg, oral, Daily    betamethasone, augmented, (Diprolene AF) 0.05 % cream 0.05 g, Daily    empagliflozin (Jardiance) 10 mg 1 tablet, Daily    hydroCHLOROthiazide (HYDRODiuril) 25 mg tablet 1 tablet, Daily    losartan (Cozaar) 100 mg tablet 1 tablet, Daily    metFORMIN (Glucophage) 500 mg tablet 0.5 tablets, Daily    niacinamide 500 mg, 2 times daily       Physical Exam:  Constitutional:       Appearance: Healthy appearance. Overweight and not in distress.   Neck:      Vascular: No JVR. JVD normal.   Pulmonary:      Effort: Pulmonary effort is normal.      Breath sounds: Normal breath sounds. No wheezing. No rhonchi. No rales.   Chest:      Chest wall: Not tender to palpatation.   Cardiovascular:      PMI at left midclavicular line. Normal rate. Regular rhythm. Normal S1. Normal S2.       Murmurs: There is no murmur.      No gallop.  No click. No rub.   Pulses:     Intact distal pulses.   Edema:     Peripheral edema absent.   Abdominal:      General: Abdomen is protuberant. Bowel sounds are normal.      Palpations: Abdomen is soft.      Tenderness: There is no abdominal tenderness.   Musculoskeletal: Normal range of motion.         General: No tenderness. Skin:     General: Skin is warm and dry.   Neurological:      General: No focal deficit present. "      Mental Status: Alert and oriented to person, place and time.          Last Labs:  CBC -  Lab Results   Component Value Date    WBC 3.6 (L) 05/09/2024    HGB 15.8 05/09/2024    HCT 47.2 05/09/2024    MCV 87 05/09/2024    PLT 79 (L) 05/09/2024       CMP -  Lab Results   Component Value Date    CALCIUM 9.3 05/09/2024    PROT 6.9 05/09/2024    ALBUMIN 4.2 05/09/2024    AST 16 05/09/2024    ALT 19 05/09/2024    ALKPHOS 49 05/09/2024    BILITOT 0.8 05/09/2024       LIPID PANEL -   Lab Results   Component Value Date    CHOL 174 04/09/2020    TRIG 74 04/09/2020    HDL 52.2 04/09/2020    CHHDL 3.3 04/09/2020    LDLF 107 (H) 04/09/2020    VLDL 15 04/09/2020 2/2024  T1 56  Ldl 85  RENAL FUNCTION PANEL -   Lab Results   Component Value Date    GLUCOSE 101 (H) 05/09/2024     05/09/2024    K 3.9 05/09/2024     05/09/2024    CO2 31 05/09/2024    ANIONGAP 10 05/09/2024    BUN 18 05/09/2024    CREATININE 0.86 05/09/2024    GFRMALE 79 05/11/2023    CALCIUM 9.3 05/09/2024    ALBUMIN 4.2 05/09/2024 6/2024  Cr=.96  K=4.2    Lab Results   Component Value Date    HGBA1C 6.1 (H) 06/20/2024           Lab review: I have personally reviewed the laboratory result(s)       Problem List Items Addressed This Visit       Essential hypertension, benign    Overview     BP well controlled.  6/2024 BMP OK         Relevant Orders    ECG 12 lead (Clinic Performed) (Completed)    Follow Up In Cardiology    Multiple premature ventricular complexes - Primary    Overview     No structural heart dz per 11/2021 stress echo  In NSR on exam/EKG today  Had bradycardia on beta blocker         Pure hypercholesterolemia    Overview     Started on moderate intensity statin with JPX=354  As has DM will increased atorv to 20 mg daily   2/2024 LDL=85  Note: zero calcium score in the past.         Relevant Orders    Lipid Panel    Cardiac risk counseling    Overview     Zero coronary calcium score          Lipids-feb      Vahid Cruz DO   bones(Osteoporosis,prev fx,steroid use,metastatic bone ca/coagulation(Bleeding disorder R/T clinical cond/anti-coags)

## 2024-11-11 NOTE — ASSESSMENT & PLAN NOTE
MDS - low blasts (WHO 2022)   Monitoring:  - CBCs every 6 months  - reviewed call precautions  Treatment:  - none at this time  - PLT have been < 100.  87 today.  Repeat Bmbx if Plt get to 50     F/U in 6 months visit with CELIA. Pt is aware to call us in the interim with any questions, concerns, or changing symptoms.

## 2024-11-13 LAB
ATRIAL RATE: 61 BPM
P AXIS: 52 DEGREES
P OFFSET: 184 MS
P ONSET: 133 MS
PR INTERVAL: 184 MS
Q ONSET: 225 MS
QRS COUNT: 10 BEATS
QRS DURATION: 82 MS
QT INTERVAL: 422 MS
QTC CALCULATION(BAZETT): 424 MS
QTC FREDERICIA: 424 MS
R AXIS: 56 DEGREES
T AXIS: 59 DEGREES
T OFFSET: 436 MS
VENTRICULAR RATE: 61 BPM

## 2024-11-14 ENCOUNTER — LAB (OUTPATIENT)
Dept: LAB | Facility: CLINIC | Age: 69
End: 2024-11-14
Payer: MEDICARE

## 2024-11-14 ENCOUNTER — OFFICE VISIT (OUTPATIENT)
Dept: HEMATOLOGY/ONCOLOGY | Facility: CLINIC | Age: 69
End: 2024-11-14
Payer: MEDICARE

## 2024-11-14 VITALS
DIASTOLIC BLOOD PRESSURE: 80 MMHG | SYSTOLIC BLOOD PRESSURE: 146 MMHG | OXYGEN SATURATION: 96 % | RESPIRATION RATE: 16 BRPM | WEIGHT: 192.9 LBS | TEMPERATURE: 97.7 F | HEART RATE: 68 BPM | BODY MASS INDEX: 27.68 KG/M2

## 2024-11-14 DIAGNOSIS — D46.9 MYELODYSPLASTIC SYNDROME (MULTI): Primary | ICD-10-CM

## 2024-11-14 DIAGNOSIS — D46.9 MYELODYSPLASTIC SYNDROME (MULTI): ICD-10-CM

## 2024-11-14 LAB
ALBUMIN SERPL BCP-MCNC: 4.4 G/DL (ref 3.4–5)
ALP SERPL-CCNC: 52 U/L (ref 33–136)
ALT SERPL W P-5'-P-CCNC: 22 U/L (ref 10–52)
ANION GAP SERPL CALC-SCNC: 10 MMOL/L (ref 10–20)
AST SERPL W P-5'-P-CCNC: 17 U/L (ref 9–39)
BASOPHILS # BLD AUTO: 0.03 X10*3/UL (ref 0–0.1)
BASOPHILS NFR BLD AUTO: 0.8 %
BILIRUB SERPL-MCNC: 0.8 MG/DL (ref 0–1.2)
BUN SERPL-MCNC: 20 MG/DL (ref 6–23)
CALCIUM SERPL-MCNC: 9.6 MG/DL (ref 8.6–10.3)
CHLORIDE SERPL-SCNC: 103 MMOL/L (ref 98–107)
CO2 SERPL-SCNC: 31 MMOL/L (ref 21–32)
CREAT SERPL-MCNC: 0.87 MG/DL (ref 0.5–1.3)
EGFRCR SERPLBLD CKD-EPI 2021: >90 ML/MIN/1.73M*2
EOSINOPHIL # BLD AUTO: 0.02 X10*3/UL (ref 0–0.7)
EOSINOPHIL NFR BLD AUTO: 0.6 %
ERYTHROCYTE [DISTWIDTH] IN BLOOD BY AUTOMATED COUNT: 15.2 % (ref 11.5–14.5)
GLUCOSE SERPL-MCNC: 104 MG/DL (ref 74–99)
HCT VFR BLD AUTO: 47.6 % (ref 41–52)
HGB BLD-MCNC: 15.8 G/DL (ref 13.5–17.5)
IMM GRANULOCYTES # BLD AUTO: 0.01 X10*3/UL (ref 0–0.7)
IMM GRANULOCYTES NFR BLD AUTO: 0.3 % (ref 0–0.9)
LYMPHOCYTES # BLD AUTO: 1.11 X10*3/UL (ref 1.2–4.8)
LYMPHOCYTES NFR BLD AUTO: 30.7 %
MCH RBC QN AUTO: 28.7 PG (ref 26–34)
MCHC RBC AUTO-ENTMCNC: 33.2 G/DL (ref 32–36)
MCV RBC AUTO: 86 FL (ref 80–100)
MONOCYTES # BLD AUTO: 0.65 X10*3/UL (ref 0.1–1)
MONOCYTES NFR BLD AUTO: 18 %
NEUTROPHILS # BLD AUTO: 1.8 X10*3/UL (ref 1.2–7.7)
NEUTROPHILS NFR BLD AUTO: 49.6 %
NRBC BLD-RTO: ABNORMAL /100{WBCS}
PLATELET # BLD AUTO: 87 X10*3/UL (ref 150–450)
POTASSIUM SERPL-SCNC: 3.7 MMOL/L (ref 3.5–5.3)
PROT SERPL-MCNC: 7.2 G/DL (ref 6.4–8.2)
RBC # BLD AUTO: 5.51 X10*6/UL (ref 4.5–5.9)
RBC MORPH BLD: NORMAL
SODIUM SERPL-SCNC: 140 MMOL/L (ref 136–145)
WBC # BLD AUTO: 3.6 X10*3/UL (ref 4.4–11.3)

## 2024-11-14 PROCEDURE — 3077F SYST BP >= 140 MM HG: CPT | Performed by: NURSE PRACTITIONER

## 2024-11-14 PROCEDURE — 3079F DIAST BP 80-89 MM HG: CPT | Performed by: NURSE PRACTITIONER

## 2024-11-14 PROCEDURE — 99214 OFFICE O/P EST MOD 30 MIN: CPT | Performed by: NURSE PRACTITIONER

## 2024-11-14 PROCEDURE — 1159F MED LIST DOCD IN RCRD: CPT | Performed by: NURSE PRACTITIONER

## 2024-11-14 PROCEDURE — 36415 COLL VENOUS BLD VENIPUNCTURE: CPT

## 2024-11-14 PROCEDURE — 85025 COMPLETE CBC W/AUTO DIFF WBC: CPT

## 2024-11-14 PROCEDURE — 1126F AMNT PAIN NOTED NONE PRSNT: CPT | Performed by: NURSE PRACTITIONER

## 2024-11-14 PROCEDURE — 80053 COMPREHEN METABOLIC PANEL: CPT

## 2024-11-14 PROCEDURE — 1036F TOBACCO NON-USER: CPT | Performed by: NURSE PRACTITIONER

## 2024-11-14 PROCEDURE — 1160F RVW MEDS BY RX/DR IN RCRD: CPT | Performed by: NURSE PRACTITIONER

## 2024-11-14 PROCEDURE — 99417 PROLNG OP E/M EACH 15 MIN: CPT | Performed by: NURSE PRACTITIONER

## 2024-11-14 ASSESSMENT — ENCOUNTER SYMPTOMS
ABDOMINAL PAIN: 0
HEADACHES: 0
FEVER: 0
COUGH: 0
SHORTNESS OF BREATH: 0
NUMBNESS: 0
CHILLS: 0
BLOOD IN STOOL: 0
UNEXPECTED WEIGHT CHANGE: 0
DIARRHEA: 0
LIGHT-HEADEDNESS: 0
DYSURIA: 0
DIZZINESS: 0
CHEST TIGHTNESS: 0
CONSTIPATION: 0
VOMITING: 0
NAUSEA: 0
HEMATURIA: 0

## 2024-11-14 ASSESSMENT — PAIN SCALES - GENERAL: PAINLEVEL_OUTOF10: 0-NO PAIN

## 2024-11-25 DIAGNOSIS — E78.00 PURE HYPERCHOLESTEROLEMIA: ICD-10-CM

## 2024-11-25 RX ORDER — ATORVASTATIN CALCIUM 20 MG/1
20 TABLET, FILM COATED ORAL DAILY
Qty: 100 TABLET | Refills: 3 | Status: SHIPPED | OUTPATIENT
Start: 2024-12-17

## 2025-05-08 ENCOUNTER — APPOINTMENT (OUTPATIENT)
Dept: DERMATOLOGY | Facility: CLINIC | Age: 70
End: 2025-05-08
Payer: MEDICARE

## 2025-05-08 VITALS — SYSTOLIC BLOOD PRESSURE: 159 MMHG | HEART RATE: 72 BPM | DIASTOLIC BLOOD PRESSURE: 66 MMHG

## 2025-05-08 DIAGNOSIS — C44.319 BASAL CELL CARCINOMA (BCC) OF SKIN OF OTHER PART OF FACE: ICD-10-CM

## 2025-05-08 PROCEDURE — 17311 MOHS 1 STAGE H/N/HF/G: CPT | Performed by: DERMATOLOGY

## 2025-05-08 PROCEDURE — 13132 CMPLX RPR F/C/C/M/N/AX/G/H/F: CPT | Performed by: DERMATOLOGY

## 2025-05-08 NOTE — PROGRESS NOTES
Mohs Surgery Operative Note    Date of Surgery:  5/8/2025  Surgeon:  Micah Gabriel MD PhD  Office Location: [address]  [address]  [facility] [address]  Dept: [phone]  Dept Fax: [phone]  Referring Provider: Miky Aparicio MD  [address] [facility] [address]  [place] [address]      Assessment/Plan   Pre-procedure:   Obtained informed consent: written from patient  The surgical site was identified and confirmed with the patient.     Intra-operative:   Audible time out called at : 11:56 AM 05/08/25  by: Farrah Reyes LPN   Verified patient name, birthdate, site, specimen bottle label & requisition.    The planned procedure(s) was again reviewed with the patient. The risks of bleeding, infection, nerve damage and scarring were reviewed. Written authorization was obtained. The patient identity, surgical site, and planned procedure(s) were verified. The provider acted as both surgeon and pathologist.     BASAL CELL CARCINOMA (BCC) OF SKIN OF OTHER PART OF FACE  Right [place]  Mohs surgery    Consent obtained: written    Universal Protocol:  Procedure explained and questions answered to patient or proxy's satisfaction: Yes    Test results available and properly labeled: Yes    Pathology report reviewed: Yes    External notes reviewed: Yes    Photo or diagram used for site identification: Yes    Site/side marked: Yes    Slide independently reviewed by Mohs surgeon: Yes    Immediately prior to procedure a time out was called: Yes    Patient identity confirmed: verbally with patient  Preparation: Patient was prepped and draped in usual sterile fashion      Anticoagulation:  Is the patient taking prescription anticoagulant and/or aspirin prescribed/recommended by a physician? No    Was the anticoagulation regimen changed prior to Mohs? No      Anesthesia:  Anesthesia method: local infiltration  Local anesthetic: lidocaine 1% WITH epi    Procedure Details:  Case ID  Number: [id number]  Biopsy accession number: [id number]  Biopsy date: 04/09/2025.  Frozen section biopsy performed: No    Pre-Op diagnosis: basal cell carcinoma  BCC subtype: nodular  Surgical site (from skin exam): Right [place]  Pre-operative length (cm): 0.3  Pre-operative width (cm): 0.4  Indications for Mohs surgery: anatomic location where tissue conservation is critical  Previously treated? No      Micrographic Surgery Details:  Post-operative length (cm): 1.4  Post-operative width (cm): 1.5  Number of Mohs stages: 1    Stage 1     Comments: The patient was brought into the operating room and placed in the procedure chair in the appropriate position.  The area positive by previous biopsy was identified and confirmed with the patient. The area of clinically obvious tumor was debulked using a curette and/or scalpel as needed. An incision was made following the Mohs approach through the skin. The specimen was taken to the lab, divided into 2 piece(s) and appropriately chromacoded and processed.                 Tumor features identified on Mohs section: no tumor identified      Depth of invasion: subcutaneous fat    Depth of defect: subcutaneous fat    Patient tolerance of procedure: tolerated well, no immediate complications    Reconstruction:  Was the defect reconstructed? Yes    Was reconstruction performed by the same Mohs surgeon? Yes    Setting of reconstruction: outpatient office  When was reconstruction performed? same day  Type of reconstruction: linear  Linear reconstruction: complex  Length of linear repair (cm): 3.1  Subcutaneous Layers (Deep Stitches)   Suture size:  5-0  Suture type:  Vicryl  Stitches:  Buried horizontal mattress  Fine/surface layer approximation (top stitches)   Epidermal/Superficial suture size:  5-0  Epidermal/Superficial suture type:  Fast-absorbing gut  Stitches: simple running    Hemostasis achieved with: electrodesiccation  Outcome: patient tolerated procedure well with no  "complications    Post-procedure details: sterile dressing applied and wound care instructions given    Dressing type: petrolatum gauze, Telfa pad and pressure dressing      Complex Linear Repair - Wide Undermining:  Given the location and size of the defect, it was determined that a complex layered linear closure was required to restore normal anatomy and function. The repair was considered complex because extensive undermining was required and performed. The amount of undermining performed was greater than the maximum width of the defect as measured perpendicular to the closure line along at least one entire edge of the defect. Standing cutaneous cones were removed using Burow's triangles. The wound edges were brought into close approximation with buried vertical mattress sutures. The remainder of the wound was then closed with epidermal top sutures.      The final repair measured 3.1 cm    Wound care was discussed, and the patient was given written post-operative wound care instructions.      The patient will follow up with Micah Gabriel MD PhD as needed for any post operative problems or concerns, and will follow up with their primary dermatologist as scheduled.                     Office Visit Note  Date: 5/8/2025  Surgeon:  Micah Gabriel MD PhD  Office Location: [address]  [place] [id number]  Dept: [phone]  Dept Fax: [phone]    Referring Provider: Miky Aparicio MD  [address] [facility] [address]  [place] [id number]    Subjective   Gerry Christian \"Sebastien\" is a 69 y.o. male who presents for the following: MOHS Surgery (Right [place], BCC )    According to the patient, the lesion has been present for approximately 6 months at the time of diagnosis.  The lesion is not causing symptoms.  The lesion has not been treated previously.    The patient does not have a pacemaker / defibrillator.  The patient does not have a heart valve / joint " replacement.    The patient is not on blood thinners.  The patient does not have a history of hepatitis B or C.  The patient does not have a history of HIV.  The patient does not have a history of immunosuppression (e.g. organ transplantation, malignancy, medications)    Review of Systems:  No other skin or systemic complaints other than what is documented elsewhere in the note.    MEDICAL HISTORY: clinically relevant history including significant past medical history, medications and allergies was reviewed and documented in Epic.    Objective   Well appearing patient in no apparent distress; mood and affect are within normal limits.  Vital signs: See record.  Noted on the   Right [place]  Is a 0..3 x 0.4 cm scar    The patient confirmed the identified site.    Discussion:  The nature of the diagnosis was explained. The lesion is a skin cancer.  It has a risk of local growth and distant spread. The condition is associated with sun exposure.  Warning signs of non-melanoma skin cancer discussed. Patient was instructed to perform monthly self skin examination.  We recommended that the patient have regular full skin exams given an increased risk of subsequent skin cancers. The patient was instructed to use sun protective behaviors including use of broad spectrum sunscreens and sun protective clothing to reduce risk of skin cancers.      Risks, benefits, side effects of Mohs surgery were discussed with patient and the patient voiced understanding.  It was explained that even though the cure rate of Mohs is very high it is not 100%. Risks of surgery including but not limited to bleeding, infection, numbness, nerve damage, and scar were reviewed.  Discussion included wound care requirements, activity restrictions, likely scar outcome and time to heal.     After Mohs surgery, the defect may need to be repaired surgically and the scar may be longer than the original lesion.  Reconstruction options, risks, and benefits were  reviewed including second intention healing, linear repair (4-1 ratio was explained), local flaps, skin grafts, cartilage grafts and interpolation flaps (the need for multiple surgeries was explained). Possible outcomes were reviewed including likely scar appearance, failure of flap survival, infection, bleeding and the need for revision surgery.     The pathology was reviewed, the photograph was reviewed, and the referring physician's note was reviewed.    Patient elected for Mohs surgery.

## 2025-05-11 PROBLEM — R00.1 BRADYCARDIA, SINUS: Status: RESOLVED | Noted: 2023-11-06 | Resolved: 2025-05-11

## 2025-05-11 NOTE — PROGRESS NOTES
"Patient ID: Gerry Christian \"Sebastien\" is a 69 y.o. male.  Referring Physician: Zenia Torres, APRN-CNP  [address]  [place] [address]  Primary Care Provider: Doyle Child MD    Date of Service:  5/15/2025    SUBJECTIVE:  Patient presents today, accompanied by his wife, for follow up.  Reports that he has been feeling well.  He has not had any illnesses or changes in his health.  Recent colonoscopy with polyps, will repeat in 3 year.  He also had a basal cell removed from his left temple area.  Otherwise, he remains active.  Denies fevers/chills, night sweats or unexplained weight loss.       Oncology History Overview Note   Clonal Cytopenia of Undetermined Significance:  diagnosis:   Mr. Christian has been followed since 2015 for a chronic neutropenia.  He had a bone marrow biopsy in 2015 that resulted in no evidence of myelodysplastic syndrome.  In 2019, with a co-occurrence of now midl thrombocytopenia of platelets 100-150, Dr. Shi sent peripheral flow that returned with low  expression, and a repeat marrow was completed on 9/9/2019.  This identified mild megakaryocyte atypia not meeting the critiera for myelodysplastic syndrome.  Potassiumarytype remained normal, but  a myeloid next generation sequencing panel identified mutations in ASXL1, SRSF2, and two mutations in TET2.  Thus, consistent wtih clonal cytopenia of undetermined significance.  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 Christian had a bone marrow biopsy completed on 12/16/2020 that demonstrated:   -- HYPERCELLULAR MARROW FOR AGE (70-80%) WITH DYSMEGAKARYOPOIESIS (>10%),  MILD  DYSERYTHROPOIESIS AND NO INCREASE IN BLASTS. SEE NOTE.      NOTE: Per electronic medical records, patient's previous diagnosis of clonal  cytopenias of undetermined " significance (CCUS) is noted. Current bone marrow  biopsy and aspirate smears showed hypercellular marrow for age (70-80%) with  dysmegakaryopoiesis (>10%) and mild dyserythropoiesis. There is no increase  in  blast count by aspirate smear differential count and by CD34 immunostain. Flow  cytometry studies showed monocytes with aberrant CD56 expression and mature  granulocytes with low-level , findings associated with myelodysplasia.  Overall, the morphologic,immunophenotypic and genetic findings are compatible  with myelodysplastic syndrome with single lineage dysplasia (MDS-SLD). Clinical  correlation is recommended.     Treatment: none to date - patient is observed        Myelodysplastic syndrome (Multi)   12/23/2020 Initial Diagnosis    Myelodysplastic syndrome (CMS/HCC)        OBJECTIVE:  KPS: Karnofsky Score: 100 - Fully active, able to carry on all pre-disease performed without restriction     Physical Exam  Constitutional:       Appearance: Normal appearance.   HENT:      Head: Normocephalic.      Mouth/Throat:      Mouth: Mucous membranes are moist.      Pharynx: Oropharynx is clear. No oropharyngeal exudate.   Eyes:      Pupils: Pupils are equal, round, and reactive to light.   Cardiovascular:      Rate and Rhythm: Normal rate and regular rhythm.      Pulses: Normal pulses.      Heart sounds: Normal heart sounds.   Pulmonary:      Effort: Pulmonary effort is normal.      Breath sounds: Normal breath sounds.   Abdominal:      General: Bowel sounds are normal.      Palpations: Abdomen is soft.   Musculoskeletal:         General: Normal range of motion.      Cervical back: Normal range of motion and neck supple.      Right lower leg: No edema.      Left lower leg: No edema.   Lymphadenopathy:      Comments: No lymphadenopathy   Skin:     General: Skin is warm and dry.      Findings: No lesion or rash.   Neurological:      General: No focal deficit present.      Mental Status: He is alert and oriented to  person, place, and time. Mental status is at baseline.      Comments: No numbness or tingling   Psychiatric:         Mood and Affect: Mood normal.       Assessment & Plan  Myelodysplastic syndrome (Multi)  MDS - low blasts (WHO 2022)   Monitoring:  - CBCs every 6 months  - reviewed call precautions  Treatment:  - none at this time  - PLT have been < 100.  85 today.  Repeat Bmbx if Plt get to 50    Routine cancer screenings:  Colonoscopy in 2025, next in 2028  Following with dermatology    RTC:  Pt is aware to call us in the interim with any questions, concerns, or changing symptoms.    Q 6 month MD/CELIA follow up    Zenia Torres, DONTAE-CNP

## 2025-05-11 NOTE — ASSESSMENT & PLAN NOTE
MDS - low blasts (WHO 2022)   Monitoring:  - CBCs every 6 months  - reviewed call precautions  Treatment:  - none at this time  - PLT have been < 100.  85 today.  Repeat Bmbx if Plt get to 50    Routine cancer screenings:  Colonoscopy in 2025, next in 2028  Following with dermatology

## 2025-05-14 ENCOUNTER — LAB (OUTPATIENT)
Dept: LAB | Facility: CLINIC | Age: 70
End: 2025-05-14
Payer: MEDICARE

## 2025-05-14 DIAGNOSIS — D46.9 MYELODYSPLASTIC SYNDROME (MULTI): ICD-10-CM

## 2025-05-14 LAB
ALBUMIN SERPL BCP-MCNC: 4.1 G/DL (ref 3.4–5)
ALP SERPL-CCNC: 47 U/L (ref 33–136)
ALT SERPL W P-5'-P-CCNC: 20 U/L (ref 10–52)
ANION GAP SERPL CALC-SCNC: 9 MMOL/L (ref 10–20)
AST SERPL W P-5'-P-CCNC: 17 U/L (ref 9–39)
BASOPHILS # BLD AUTO: 0.02 X10*3/UL (ref 0–0.1)
BASOPHILS NFR BLD AUTO: 0.9 %
BILIRUB SERPL-MCNC: 0.9 MG/DL (ref 0–1.2)
BUN SERPL-MCNC: 23 MG/DL (ref 6–23)
CALCIUM SERPL-MCNC: 9 MG/DL (ref 8.6–10.3)
CHLORIDE SERPL-SCNC: 105 MMOL/L (ref 98–107)
CO2 SERPL-SCNC: 28 MMOL/L (ref 21–32)
CREAT SERPL-MCNC: 0.94 MG/DL (ref 0.5–1.3)
EGFRCR SERPLBLD CKD-EPI 2021: 87 ML/MIN/1.73M*2
EOSINOPHIL # BLD AUTO: 0.01 X10*3/UL (ref 0–0.7)
EOSINOPHIL NFR BLD AUTO: 0.5 %
ERYTHROCYTE [DISTWIDTH] IN BLOOD BY AUTOMATED COUNT: 14.8 % (ref 11.5–14.5)
GLUCOSE SERPL-MCNC: 111 MG/DL (ref 74–99)
HCT VFR BLD AUTO: 46 % (ref 41–52)
HGB BLD-MCNC: 15.2 G/DL (ref 13.5–17.5)
IMM GRANULOCYTES # BLD AUTO: 0.01 X10*3/UL (ref 0–0.7)
IMM GRANULOCYTES NFR BLD AUTO: 0.5 % (ref 0–0.9)
LYMPHOCYTES # BLD AUTO: 0.8 X10*3/UL (ref 1.2–4.8)
LYMPHOCYTES NFR BLD AUTO: 36 %
MCH RBC QN AUTO: 28.9 PG (ref 26–34)
MCHC RBC AUTO-ENTMCNC: 33 G/DL (ref 32–36)
MCV RBC AUTO: 88 FL (ref 80–100)
MONOCYTES # BLD AUTO: 0.63 X10*3/UL (ref 0.1–1)
MONOCYTES NFR BLD AUTO: 28.4 %
NEUTROPHILS # BLD AUTO: 0.75 X10*3/UL (ref 1.2–7.7)
NEUTROPHILS NFR BLD AUTO: 33.7 %
NRBC BLD-RTO: ABNORMAL /100{WBCS}
PLATELET # BLD AUTO: 85 X10*3/UL (ref 150–450)
POTASSIUM SERPL-SCNC: 3.9 MMOL/L (ref 3.5–5.3)
PROT SERPL-MCNC: 6.7 G/DL (ref 6.4–8.2)
RBC # BLD AUTO: 5.26 X10*6/UL (ref 4.5–5.9)
SODIUM SERPL-SCNC: 138 MMOL/L (ref 136–145)
WBC # BLD AUTO: 2.2 X10*3/UL (ref 4.4–11.3)

## 2025-05-14 PROCEDURE — 36415 COLL VENOUS BLD VENIPUNCTURE: CPT

## 2025-05-14 PROCEDURE — 85025 COMPLETE CBC W/AUTO DIFF WBC: CPT

## 2025-05-14 PROCEDURE — 80053 COMPREHEN METABOLIC PANEL: CPT

## 2025-05-15 ENCOUNTER — OFFICE VISIT (OUTPATIENT)
Dept: HEMATOLOGY/ONCOLOGY | Facility: CLINIC | Age: 70
End: 2025-05-15
Payer: MEDICARE

## 2025-05-15 VITALS
RESPIRATION RATE: 16 BRPM | BODY MASS INDEX: 28.09 KG/M2 | WEIGHT: 195.8 LBS | OXYGEN SATURATION: 97 % | SYSTOLIC BLOOD PRESSURE: 145 MMHG | DIASTOLIC BLOOD PRESSURE: 80 MMHG | HEART RATE: 72 BPM | TEMPERATURE: 98.4 F

## 2025-05-15 DIAGNOSIS — D46.9 MYELODYSPLASTIC SYNDROME (MULTI): Primary | ICD-10-CM

## 2025-05-15 PROCEDURE — 1160F RVW MEDS BY RX/DR IN RCRD: CPT | Performed by: NURSE PRACTITIONER

## 2025-05-15 PROCEDURE — 99214 OFFICE O/P EST MOD 30 MIN: CPT | Performed by: NURSE PRACTITIONER

## 2025-05-15 PROCEDURE — G2211 COMPLEX E/M VISIT ADD ON: HCPCS | Performed by: NURSE PRACTITIONER

## 2025-05-15 PROCEDURE — 1126F AMNT PAIN NOTED NONE PRSNT: CPT | Performed by: NURSE PRACTITIONER

## 2025-05-15 PROCEDURE — 1159F MED LIST DOCD IN RCRD: CPT | Performed by: NURSE PRACTITIONER

## 2025-05-15 PROCEDURE — 3079F DIAST BP 80-89 MM HG: CPT | Performed by: NURSE PRACTITIONER

## 2025-05-15 PROCEDURE — 3077F SYST BP >= 140 MM HG: CPT | Performed by: NURSE PRACTITIONER

## 2025-05-15 ASSESSMENT — PAIN SCALES - GENERAL: PAINLEVEL_OUTOF10: 0-NO PAIN
